# Patient Record
Sex: FEMALE | Race: BLACK OR AFRICAN AMERICAN | Employment: FULL TIME | URBAN - METROPOLITAN AREA
[De-identification: names, ages, dates, MRNs, and addresses within clinical notes are randomized per-mention and may not be internally consistent; named-entity substitution may affect disease eponyms.]

---

## 2017-10-12 PROBLEM — Z12.11 SCREENING FOR COLON CANCER: Status: ACTIVE | Noted: 2017-10-12

## 2017-10-12 PROBLEM — R10.13 EPIGASTRIC ABDOMINAL PAIN: Status: ACTIVE | Noted: 2017-10-12

## 2017-10-12 PROBLEM — K59.04 CHRONIC IDIOPATHIC CONSTIPATION: Status: ACTIVE | Noted: 2017-10-12

## 2017-10-21 ENCOUNTER — LAB ENCOUNTER (OUTPATIENT)
Dept: LAB | Facility: HOSPITAL | Age: 52
End: 2017-10-21
Attending: INTERNAL MEDICINE
Payer: COMMERCIAL

## 2017-10-21 DIAGNOSIS — E11.9 TYPE 2 DIABETES MELLITUS WITHOUT COMPLICATION, WITHOUT LONG-TERM CURRENT USE OF INSULIN (HCC): ICD-10-CM

## 2017-10-21 DIAGNOSIS — R10.13 EPIGASTRIC ABDOMINAL PAIN: ICD-10-CM

## 2017-10-21 DIAGNOSIS — K59.04 CHRONIC IDIOPATHIC CONSTIPATION: ICD-10-CM

## 2017-10-21 PROCEDURE — 85025 COMPLETE CBC W/AUTO DIFF WBC: CPT

## 2017-10-21 PROCEDURE — 83036 HEMOGLOBIN GLYCOSYLATED A1C: CPT

## 2017-10-21 PROCEDURE — 84443 ASSAY THYROID STIM HORMONE: CPT

## 2017-10-21 PROCEDURE — 36415 COLL VENOUS BLD VENIPUNCTURE: CPT

## 2017-10-21 PROCEDURE — 80053 COMPREHEN METABOLIC PANEL: CPT

## 2017-11-01 ENCOUNTER — OFFICE VISIT (OUTPATIENT)
Dept: INTERNAL MEDICINE CLINIC | Facility: CLINIC | Age: 52
End: 2017-11-01

## 2017-11-01 VITALS
TEMPERATURE: 98 F | HEIGHT: 64 IN | DIASTOLIC BLOOD PRESSURE: 86 MMHG | SYSTOLIC BLOOD PRESSURE: 128 MMHG | RESPIRATION RATE: 16 BRPM | HEART RATE: 125 BPM | BODY MASS INDEX: 31.07 KG/M2 | WEIGHT: 182 LBS | OXYGEN SATURATION: 98 %

## 2017-11-01 DIAGNOSIS — B35.3 TINEA PEDIS OF RIGHT FOOT: ICD-10-CM

## 2017-11-01 DIAGNOSIS — IMO0001 UNCONTROLLED TYPE 2 DIABETES MELLITUS WITHOUT COMPLICATION, WITHOUT LONG-TERM CURRENT USE OF INSULIN: Primary | ICD-10-CM

## 2017-11-01 PROBLEM — E11.9 TYPE 2 DIABETES MELLITUS WITHOUT COMPLICATION, WITHOUT LONG-TERM CURRENT USE OF INSULIN (HCC): Status: ACTIVE | Noted: 2017-11-01

## 2017-11-01 PROBLEM — E11.9 TYPE 2 DIABETES MELLITUS WITHOUT COMPLICATION, WITHOUT LONG-TERM CURRENT USE OF INSULIN (HCC): Status: RESOLVED | Noted: 2017-11-01 | Resolved: 2017-11-01

## 2017-11-01 PROCEDURE — 99203 OFFICE O/P NEW LOW 30 MIN: CPT | Performed by: STUDENT IN AN ORGANIZED HEALTH CARE EDUCATION/TRAINING PROGRAM

## 2017-11-01 RX ORDER — CLOTRIMAZOLE 1 %
1 CREAM (GRAM) TOPICAL 2 TIMES DAILY
Qty: 40 G | Refills: 0 | Status: SHIPPED | OUTPATIENT
Start: 2017-11-01

## 2017-11-01 RX ORDER — GLIMEPIRIDE 2 MG/1
2 TABLET ORAL
Qty: 30 TABLET | Refills: 0 | Status: SHIPPED | OUTPATIENT
Start: 2017-11-01 | End: 2018-01-01

## 2017-11-01 NOTE — PROGRESS NOTES
HPI:    Patient ID: April Karly Dejesus is a 49year old female. She is a new patient to the office. Was sent by her GI due to uncontrolled DM. She has intermittent diffuse abdominal pain and constipation.  Upper and lower endoscopies are planned when her DM is be Skin: Negative. Neurological: Negative. Negative for weakness. Hematological: Negative. Psychiatric/Behavioral: Negative.                Current Outpatient Prescriptions:  clotrimazole 1 % External Cream Apply 1 Application topically 2 (two) time complication, without long-term current use of insulin (hcc)  (primary encounter diagnosis)  Due to non-compliance. Last HbA1C is 11.8  Increase Metrformin to 850 mg BID, add Glimepiride 2 mg BID. Medicaitons side effects discussed. Monitor BS's daily.   D

## 2017-11-01 NOTE — PATIENT INSTRUCTIONS
Diabetes: Activity Tips    Being more active can help you manage your diabetes. The tips on this sheet can help you get the most from your exercise. They can also help you stay safe.   Staying Active  It’s important for adults to spend less time sitting a You may be told to plan your exercise for 1 to 2 hours after a meal. In most cases, you don’t need to eat while being active. If you take insulin or medicine that can cause low blood sugar, test your blood sugar before exercising.  And carry a fast-acting s Athlete’s foot (tinea pedis) is caused by a fungal infection in the skin. It affects the skin between the toes, causing cracks in the skin called fissures. It can also affect the bottom of the foot where it causes dry white scales and peeling of the skin. · Infection comes back soon after treatment  · Pus draining from cracks in the skin  Date Last Reviewed: 8/1/2016  © 9378-2778 [employer]. [address] [facility] [place] [address] [place] [address]. All rights reserved.  This information is not intended as a · Limit alcohol. It raises blood sugar levels. Drink water or calorie-free diet drinks that use safe sweeteners. · Eat less fat to help lower your risk of heart disease. Use nonfat or low-fat dairy products and lean meats. Avoid fried foods.  Use cooking o

## 2017-11-11 ENCOUNTER — APPOINTMENT (OUTPATIENT)
Dept: LAB | Facility: HOSPITAL | Age: 52
End: 2017-11-11
Attending: STUDENT IN AN ORGANIZED HEALTH CARE EDUCATION/TRAINING PROGRAM
Payer: COMMERCIAL

## 2017-11-11 DIAGNOSIS — R74.8 ELEVATED ALKALINE PHOSPHATASE LEVEL: ICD-10-CM

## 2017-11-11 DIAGNOSIS — B35.3 TINEA PEDIS OF RIGHT FOOT: ICD-10-CM

## 2017-11-11 DIAGNOSIS — IMO0001 UNCONTROLLED TYPE 2 DIABETES MELLITUS WITHOUT COMPLICATION, WITHOUT LONG-TERM CURRENT USE OF INSULIN: ICD-10-CM

## 2017-11-11 PROCEDURE — 82043 UR ALBUMIN QUANTITATIVE: CPT

## 2017-11-11 PROCEDURE — 84075 ASSAY ALKALINE PHOSPHATASE: CPT

## 2017-11-11 PROCEDURE — 80061 LIPID PANEL: CPT

## 2017-11-11 PROCEDURE — 84080 ASSAY ALKALINE PHOSPHATASES: CPT

## 2017-11-11 PROCEDURE — 82570 ASSAY OF URINE CREATININE: CPT

## 2017-11-11 PROCEDURE — 36415 COLL VENOUS BLD VENIPUNCTURE: CPT

## 2017-11-14 ENCOUNTER — TELEPHONE (OUTPATIENT)
Dept: INTERNAL MEDICINE CLINIC | Facility: CLINIC | Age: 52
End: 2017-11-14

## 2017-11-14 DIAGNOSIS — IMO0001 UNCONTROLLED TYPE 2 DIABETES MELLITUS WITHOUT COMPLICATION, WITHOUT LONG-TERM CURRENT USE OF INSULIN: Primary | ICD-10-CM

## 2017-11-14 DIAGNOSIS — R80.9 MICROALBUMINURIA: ICD-10-CM

## 2017-11-14 DIAGNOSIS — E78.5 HYPERLIPIDEMIA, UNSPECIFIED HYPERLIPIDEMIA TYPE: ICD-10-CM

## 2017-11-14 RX ORDER — ATORVASTATIN CALCIUM 10 MG/1
10 TABLET, FILM COATED ORAL NIGHTLY
Qty: 90 TABLET | Refills: 1 | Status: SHIPPED | OUTPATIENT
Start: 2017-11-14 | End: 2018-01-01

## 2017-11-14 RX ORDER — LOSARTAN POTASSIUM 25 MG/1
25 TABLET ORAL DAILY
Qty: 30 TABLET | Refills: 5 | Status: SHIPPED | OUTPATIENT
Start: 2017-11-14 | End: 2018-01-01

## 2017-11-14 NOTE — TELEPHONE ENCOUNTER
----- Message from Chelsea Hassan MD sent at 11/13/2017 11:01 PM CST -----  Elevated total cholesterol. Due to DM patient is in statin benefit group for CVD risk prevention. Recommend starting Atorvastatin 10 mg HS.    Microalbumin/Cr ratio is high and otto

## 2017-11-14 NOTE — TELEPHONE ENCOUNTER
Spoke with patient and relayed results. She verbalized understanding and agreed with plan. Patient has scheduled an appointment as she would like to address other issues with Dr. Darci Whitman. She c/o constipation, bloating and gas x2 days.  Also c/o vaginal

## 2017-11-16 ENCOUNTER — OFFICE VISIT (OUTPATIENT)
Dept: INTERNAL MEDICINE CLINIC | Facility: CLINIC | Age: 52
End: 2017-11-16

## 2017-11-16 VITALS
OXYGEN SATURATION: 95 % | HEIGHT: 64 IN | TEMPERATURE: 98 F | HEART RATE: 118 BPM | WEIGHT: 185 LBS | BODY MASS INDEX: 31.58 KG/M2 | SYSTOLIC BLOOD PRESSURE: 112 MMHG | RESPIRATION RATE: 18 BRPM | DIASTOLIC BLOOD PRESSURE: 78 MMHG

## 2017-11-16 DIAGNOSIS — R10.84 DIFFUSE ABDOMINAL PAIN: Primary | ICD-10-CM

## 2017-11-16 DIAGNOSIS — L30.4 INTERTRIGO: ICD-10-CM

## 2017-11-16 PROCEDURE — 99214 OFFICE O/P EST MOD 30 MIN: CPT | Performed by: STUDENT IN AN ORGANIZED HEALTH CARE EDUCATION/TRAINING PROGRAM

## 2017-11-16 RX ORDER — FLUCONAZOLE 150 MG/1
150 TABLET ORAL ONCE
Qty: 1 TABLET | Refills: 0 | Status: SHIPPED | OUTPATIENT
Start: 2017-11-16 | End: 2018-03-22

## 2017-11-16 RX ORDER — CLOTRIMAZOLE AND BETAMETHASONE DIPROPIONATE 10; .64 MG/G; MG/G
1 CREAM TOPICAL 2 TIMES DAILY
Qty: 45 G | Refills: 0 | Status: SHIPPED | OUTPATIENT
Start: 2017-11-16 | End: 2018-01-01

## 2017-11-19 NOTE — PROGRESS NOTES
HPI:    Patient ID: April Maty Huber is a 49year old female. Abdominal Pain   This is a new problem. Episode onset: about 2 weeks. The onset quality is undetermined. The problem occurs intermittently.  Duration: cramps-like pain that is short lived and goes hematuria. Musculoskeletal: Negative. Negative for arthralgias and myalgias. Skin: Positive for rash. Neurological: Negative. Negative for headaches. Psychiatric/Behavioral: Negative.              Current Outpatient Prescriptions:  clotrimazole-be time. She has normal reflexes. Skin: Skin is warm and dry. Rash (intertrigo in the lower abdomnal fold, macerated skin, no wounds.) noted. Psychiatric: She has a normal mood and affect.  Her behavior is normal. Judgment and thought content normal.   Christi

## 2017-11-25 ENCOUNTER — TELEPHONE (OUTPATIENT)
Dept: INTERNAL MEDICINE CLINIC | Facility: CLINIC | Age: 52
End: 2017-11-25

## 2017-11-25 ENCOUNTER — HOSPITAL ENCOUNTER (OUTPATIENT)
Dept: ULTRASOUND IMAGING | Age: 52
Discharge: HOME | End: 2017-11-25
Attending: STUDENT IN AN ORGANIZED HEALTH CARE EDUCATION/TRAINING PROGRAM
Payer: COMMERCIAL

## 2017-11-25 DIAGNOSIS — R10.84 DIFFUSE ABDOMINAL PAIN: ICD-10-CM

## 2017-11-25 PROCEDURE — 76700 US EXAM ABDOM COMPLETE: CPT | Performed by: STUDENT IN AN ORGANIZED HEALTH CARE EDUCATION/TRAINING PROGRAM

## 2017-11-25 NOTE — TELEPHONE ENCOUNTER
Radiology paged with ultrasound results. Called patient with result of abdominal ultrasound with the following findings:     CONCLUSION:       1. Mild bilateral hydronephrosis.      2. Possible left nephrolithiasis with the largest stone measuring up to

## 2017-11-28 ENCOUNTER — TELEPHONE (OUTPATIENT)
Dept: INTERNAL MEDICINE CLINIC | Facility: CLINIC | Age: 52
End: 2017-11-28

## 2017-11-28 DIAGNOSIS — N13.30 HYDRONEPHROSIS, UNSPECIFIED HYDRONEPHROSIS TYPE: Primary | ICD-10-CM

## 2017-11-28 NOTE — TELEPHONE ENCOUNTER
----- Message from Lisa Isabel MD sent at 11/27/2017 10:13 PM CST -----  Radiology paged to Tayler Neal on 11/25/17, results have been discussed with the patient.   It seems like bilateral hydronephrosis and nephrolithiasis are incidental findings that are not

## 2017-11-28 NOTE — TELEPHONE ENCOUNTER
[address] [place] [address] 835, [address]   Phone: [phone]   Referral information given to pt. She expressed understanding. Referral placed.

## 2018-01-01 ENCOUNTER — LAB ENCOUNTER (OUTPATIENT)
Dept: LAB | Facility: HOSPITAL | Age: 53
End: 2018-01-01
Attending: STUDENT IN AN ORGANIZED HEALTH CARE EDUCATION/TRAINING PROGRAM
Payer: COMMERCIAL

## 2018-01-01 ENCOUNTER — TELEPHONE (OUTPATIENT)
Dept: INTERNAL MEDICINE CLINIC | Facility: CLINIC | Age: 53
End: 2018-01-01

## 2018-01-01 ENCOUNTER — PATIENT OUTREACH (OUTPATIENT)
Dept: INTERNAL MEDICINE CLINIC | Facility: CLINIC | Age: 53
End: 2018-01-01

## 2018-01-01 ENCOUNTER — HOSPITAL ENCOUNTER (OUTPATIENT)
Dept: MAMMOGRAPHY | Age: 53
Discharge: HOME | End: 2018-01-01
Attending: STUDENT IN AN ORGANIZED HEALTH CARE EDUCATION/TRAINING PROGRAM
Payer: COMMERCIAL

## 2018-01-01 ENCOUNTER — OFFICE VISIT (OUTPATIENT)
Dept: INTERNAL MEDICINE CLINIC | Facility: CLINIC | Age: 53
End: 2018-01-01

## 2018-01-01 VITALS
HEART RATE: 122 BPM | DIASTOLIC BLOOD PRESSURE: 84 MMHG | BODY MASS INDEX: 30.12 KG/M2 | WEIGHT: 170 LBS | SYSTOLIC BLOOD PRESSURE: 128 MMHG | RESPIRATION RATE: 16 BRPM | TEMPERATURE: 98 F | HEIGHT: 63 IN

## 2018-01-01 DIAGNOSIS — Z12.39 BREAST CANCER SCREENING: ICD-10-CM

## 2018-01-01 DIAGNOSIS — IMO0001 UNCONTROLLED TYPE 2 DIABETES MELLITUS WITHOUT COMPLICATION, WITHOUT LONG-TERM CURRENT USE OF INSULIN: ICD-10-CM

## 2018-01-01 DIAGNOSIS — E78.5 HYPERLIPIDEMIA, UNSPECIFIED HYPERLIPIDEMIA TYPE: ICD-10-CM

## 2018-01-01 DIAGNOSIS — R80.9 MICROALBUMINURIA: ICD-10-CM

## 2018-01-01 DIAGNOSIS — G89.29 ABDOMINAL PAIN, CHRONIC, GENERALIZED: Primary | ICD-10-CM

## 2018-01-01 DIAGNOSIS — R10.84 ABDOMINAL PAIN, CHRONIC, GENERALIZED: Primary | ICD-10-CM

## 2018-01-01 DIAGNOSIS — N30.00 ACUTE CYSTITIS WITHOUT HEMATURIA: Primary | ICD-10-CM

## 2018-01-01 DIAGNOSIS — R00.0 TACHYCARDIA: ICD-10-CM

## 2018-01-01 DIAGNOSIS — N76.0 VULVOVAGINITIS: ICD-10-CM

## 2018-01-01 DIAGNOSIS — Z13.0 SCREENING FOR IRON DEFICIENCY ANEMIA: ICD-10-CM

## 2018-01-01 PROCEDURE — 81001 URINALYSIS AUTO W/SCOPE: CPT

## 2018-01-01 PROCEDURE — 80053 COMPREHEN METABOLIC PANEL: CPT

## 2018-01-01 PROCEDURE — 85025 COMPLETE CBC W/AUTO DIFF WBC: CPT

## 2018-01-01 PROCEDURE — 83036 HEMOGLOBIN GLYCOSYLATED A1C: CPT

## 2018-01-01 PROCEDURE — 99214 OFFICE O/P EST MOD 30 MIN: CPT | Performed by: STUDENT IN AN ORGANIZED HEALTH CARE EDUCATION/TRAINING PROGRAM

## 2018-01-01 PROCEDURE — 36415 COLL VENOUS BLD VENIPUNCTURE: CPT

## 2018-01-01 PROCEDURE — 80061 LIPID PANEL: CPT

## 2018-01-01 PROCEDURE — 77063 BREAST TOMOSYNTHESIS BI: CPT | Performed by: STUDENT IN AN ORGANIZED HEALTH CARE EDUCATION/TRAINING PROGRAM

## 2018-01-01 PROCEDURE — 77067 SCR MAMMO BI INCL CAD: CPT | Performed by: STUDENT IN AN ORGANIZED HEALTH CARE EDUCATION/TRAINING PROGRAM

## 2018-01-01 RX ORDER — LOSARTAN POTASSIUM 25 MG/1
25 TABLET ORAL DAILY
Qty: 90 TABLET | Refills: 2 | Status: SHIPPED | OUTPATIENT
Start: 2018-01-01

## 2018-01-01 RX ORDER — GLIMEPIRIDE 2 MG/1
2 TABLET ORAL 2 TIMES DAILY
Qty: 180 TABLET | Refills: 0 | Status: SHIPPED | OUTPATIENT
Start: 2018-01-01

## 2018-01-01 RX ORDER — SULFAMETHOXAZOLE AND TRIMETHOPRIM 800; 160 MG/1; MG/1
1 TABLET ORAL 2 TIMES DAILY
Qty: 6 TABLET | Refills: 0 | Status: SHIPPED | OUTPATIENT
Start: 2018-01-01

## 2018-01-01 RX ORDER — ATORVASTATIN CALCIUM 10 MG/1
10 TABLET, FILM COATED ORAL NIGHTLY
Qty: 90 TABLET | Refills: 1 | Status: SHIPPED | OUTPATIENT
Start: 2018-01-01

## 2018-03-13 ENCOUNTER — PATIENT OUTREACH (OUTPATIENT)
Dept: INTERNAL MEDICINE CLINIC | Facility: CLINIC | Age: 53
End: 2018-03-13

## 2018-05-16 NOTE — PROGRESS NOTES
[facility]    REASON FOR VISIT:    Current Complaints: Patient presents with:  Stomach Pain: 3 weeks. Diabetes: f/u      HPI:  April Lurdes Tadeo is a 47year old female who presents with c/o generalized abdominal pain on and off.  Reports feeling blo Application topically 2 (two) times daily. Disp: 40 g Rfl: 0   aspirin 81 MG Oral Tab Take 1 tablet (81 mg total) by mouth daily. Disp: 30 tablet Rfl: 0        REVIEW OF SYSTEMS:   Constitutional: Negative for fever, chills and fatigue.    Neurological: Holly Angles normal, visualized feet and the appearance is normal.  Bilateral monofilament/sensation of both feet is normal.  Pulsation pedal pulse exam of both lower legs/feet is normal as well. Neurological: Alert and oriented to person, place, and time.  Normal refl tablet (2 mg total) by mouth 2 (two) times daily. Tachycardia   Chronic asymptomatic tachycardia. Normal TSH. Check 24 hour Holter monitoring.  -     CARD MONITOR HOLTER 24 HOUR (CPT=93460);  Future    Vulvovaginitis   Triamcinolone–nystatin cream to a

## 2018-06-01 NOTE — TELEPHONE ENCOUNTER
Spoke with pt she will have her labs done tomorrow and will await those results then schedule imaging.

## 2018-06-04 NOTE — TELEPHONE ENCOUNTER
----- Message from Anat Riddle MD sent at 6/4/2018  9:07 AM CDT -----  Results as follows: HbA1C is still very high - pt was not taking her diabetic medications.  I spoke to her in length at her last OV regarding the importance of mediation compliance an

## 2018-06-04 NOTE — TELEPHONE ENCOUNTER
Relayed results and recommendations to patient. States she is taking all medications as directed and will call back to schedule follow up appointment. She c/o dysuria and increased frequency and agreed with Bactrim therapy. RX sent to pharmacy.

## 2019-01-01 ENCOUNTER — HOSPITAL ENCOUNTER (INPATIENT)
Facility: HOSPITAL | Age: 54
LOS: 1 days | DRG: 215 | End: 2019-01-01
Attending: STUDENT IN AN ORGANIZED HEALTH CARE EDUCATION/TRAINING PROGRAM | Admitting: HOSPITALIST
Payer: MEDICAID

## 2019-01-01 ENCOUNTER — APPOINTMENT (OUTPATIENT)
Dept: CV DIAGNOSTICS | Facility: HOSPITAL | Age: 54
DRG: 215 | End: 2019-01-01
Attending: INTERNAL MEDICINE
Payer: MEDICAID

## 2019-01-01 ENCOUNTER — APPOINTMENT (OUTPATIENT)
Dept: GENERAL RADIOLOGY | Facility: HOSPITAL | Age: 54
DRG: 215 | End: 2019-01-01
Payer: MEDICAID

## 2019-01-01 ENCOUNTER — APPOINTMENT (OUTPATIENT)
Dept: INTERVENTIONAL RADIOLOGY/VASCULAR | Facility: HOSPITAL | Age: 54
DRG: 215 | End: 2019-01-01
Attending: INTERNAL MEDICINE
Payer: MEDICAID

## 2019-01-01 ENCOUNTER — APPOINTMENT (OUTPATIENT)
Dept: GENERAL RADIOLOGY | Facility: HOSPITAL | Age: 54
DRG: 215 | End: 2019-01-01
Attending: Other
Payer: MEDICAID

## 2019-01-01 VITALS
OXYGEN SATURATION: 93 % | BODY MASS INDEX: 34 KG/M2 | HEART RATE: 164 BPM | TEMPERATURE: 91 F | SYSTOLIC BLOOD PRESSURE: 129 MMHG | WEIGHT: 192.25 LBS | DIASTOLIC BLOOD PRESSURE: 95 MMHG | RESPIRATION RATE: 100 BRPM

## 2019-01-01 DIAGNOSIS — I46.9 CARDIAC ARREST (HCC): Primary | ICD-10-CM

## 2019-01-01 PROCEDURE — 5A12012 PERFORMANCE OF CARDIAC OUTPUT, SINGLE, MANUAL: ICD-10-PCS | Performed by: STUDENT IN AN ORGANIZED HEALTH CARE EDUCATION/TRAINING PROGRAM

## 2019-01-01 PROCEDURE — 71045 X-RAY EXAM CHEST 1 VIEW: CPT | Performed by: OTHER

## 2019-01-01 PROCEDURE — 5A1935Z RESPIRATORY VENTILATION, LESS THAN 24 CONSECUTIVE HOURS: ICD-10-PCS | Performed by: STUDENT IN AN ORGANIZED HEALTH CARE EDUCATION/TRAINING PROGRAM

## 2019-01-01 PROCEDURE — 027034Z DILATION OF CORONARY ARTERY, ONE ARTERY WITH DRUG-ELUTING INTRALUMINAL DEVICE, PERCUTANEOUS APPROACH: ICD-10-PCS | Performed by: INTERNAL MEDICINE

## 2019-01-01 PROCEDURE — 0BH17EZ INSERTION OF ENDOTRACHEAL AIRWAY INTO TRACHEA, VIA NATURAL OR ARTIFICIAL OPENING: ICD-10-PCS | Performed by: STUDENT IN AN ORGANIZED HEALTH CARE EDUCATION/TRAINING PROGRAM

## 2019-01-01 PROCEDURE — 5A0221D ASSISTANCE WITH CARDIAC OUTPUT USING IMPELLER PUMP, CONTINUOUS: ICD-10-PCS | Performed by: INTERNAL MEDICINE

## 2019-01-01 PROCEDURE — 5A2204Z RESTORATION OF CARDIAC RHYTHM, SINGLE: ICD-10-PCS | Performed by: INTERNAL MEDICINE

## 2019-01-01 PROCEDURE — 93306 TTE W/DOPPLER COMPLETE: CPT | Performed by: INTERNAL MEDICINE

## 2019-01-01 PROCEDURE — 5A2204Z RESTORATION OF CARDIAC RHYTHM, SINGLE: ICD-10-PCS | Performed by: STUDENT IN AN ORGANIZED HEALTH CARE EDUCATION/TRAINING PROGRAM

## 2019-01-01 PROCEDURE — 71045 X-RAY EXAM CHEST 1 VIEW: CPT | Performed by: STUDENT IN AN ORGANIZED HEALTH CARE EDUCATION/TRAINING PROGRAM

## 2019-01-01 PROCEDURE — 02HA3RZ INSERTION OF SHORT-TERM EXTERNAL HEART ASSIST SYSTEM INTO HEART, PERCUTANEOUS APPROACH: ICD-10-PCS | Performed by: INTERNAL MEDICINE

## 2019-01-01 PROCEDURE — B41C1ZZ FLUOROSCOPY OF PELVIC ARTERIES USING LOW OSMOLAR CONTRAST: ICD-10-PCS | Performed by: INTERNAL MEDICINE

## 2019-01-01 PROCEDURE — 3E033GC INTRODUCTION OF OTHER THERAPEUTIC SUBSTANCE INTO PERIPHERAL VEIN, PERCUTANEOUS APPROACH: ICD-10-PCS | Performed by: INTERNAL MEDICINE

## 2019-01-01 PROCEDURE — 6A4Z0ZZ HYPOTHERMIA, SINGLE: ICD-10-PCS | Performed by: INTERNAL MEDICINE

## 2019-01-01 PROCEDURE — 4A023N8 MEASUREMENT OF CARDIAC SAMPLING AND PRESSURE, BILATERAL, PERCUTANEOUS APPROACH: ICD-10-PCS | Performed by: INTERNAL MEDICINE

## 2019-01-01 PROCEDURE — B2111ZZ FLUOROSCOPY OF MULTIPLE CORONARY ARTERIES USING LOW OSMOLAR CONTRAST: ICD-10-PCS | Performed by: INTERNAL MEDICINE

## 2019-01-01 PROCEDURE — 93307 TTE W/O DOPPLER COMPLETE: CPT | Performed by: INTERNAL MEDICINE

## 2019-01-01 PROCEDURE — 99223 1ST HOSP IP/OBS HIGH 75: CPT | Performed by: HOSPITALIST

## 2019-01-01 RX ORDER — SODIUM CHLORIDE 9 MG/ML
INJECTION, SOLUTION INTRAVENOUS
Status: COMPLETED | OUTPATIENT
Start: 2019-01-01 | End: 2019-01-01

## 2019-01-01 RX ORDER — HEPARIN SODIUM 5000 [USP'U]/ML
60 INJECTION INTRAVENOUS; SUBCUTANEOUS ONCE
Status: DISCONTINUED | OUTPATIENT
Start: 2019-01-01 | End: 2019-01-01

## 2019-01-01 RX ORDER — FUROSEMIDE 10 MG/ML
INJECTION INTRAMUSCULAR; INTRAVENOUS
Status: DISCONTINUED
Start: 2019-01-01 | End: 2019-01-01

## 2019-01-01 RX ORDER — BUSPIRONE HYDROCHLORIDE 15 MG/1
30 TABLET ORAL EVERY 8 HOURS
Status: DISCONTINUED | OUTPATIENT
Start: 2019-01-01 | End: 2019-01-01

## 2019-01-01 RX ORDER — LORAZEPAM 2 MG/ML
1 INJECTION INTRAMUSCULAR
Status: DISCONTINUED | OUTPATIENT
Start: 2019-01-01 | End: 2019-01-01

## 2019-01-01 RX ORDER — LIDOCAINE HYDROCHLORIDE AND EPINEPHRINE 10; 10 MG/ML; UG/ML
INJECTION, SOLUTION INFILTRATION; PERINEURAL
Status: COMPLETED
Start: 2019-01-01 | End: 2019-01-01

## 2019-01-01 RX ORDER — LORAZEPAM 2 MG/ML
INJECTION INTRAMUSCULAR
Status: COMPLETED
Start: 2019-01-01 | End: 2019-01-01

## 2019-01-01 RX ORDER — ASPIRIN 300 MG
300 SUPPOSITORY, RECTAL RECTAL ONCE
Status: DISCONTINUED | OUTPATIENT
Start: 2019-01-01 | End: 2019-01-01

## 2019-01-01 RX ORDER — HEPARIN SODIUM 5000 [USP'U]/ML
INJECTION, SOLUTION INTRAVENOUS; SUBCUTANEOUS
Status: COMPLETED
Start: 2019-01-01 | End: 2019-01-01

## 2019-01-01 RX ORDER — PHENYLEPHRINE HCL IN 0.9% NACL 50MG/250ML
PLASTIC BAG, INJECTION (ML) INTRAVENOUS
Status: COMPLETED
Start: 2019-01-01 | End: 2019-01-01

## 2019-01-01 RX ORDER — HEPARIN SODIUM 5000 [USP'U]/ML
INJECTION, SOLUTION INTRAVENOUS; SUBCUTANEOUS
Status: DISPENSED
Start: 2019-01-01 | End: 2019-01-01

## 2019-01-01 RX ORDER — PHENYLEPHRINE HCL IN 0.9% NACL 50MG/250ML
PLASTIC BAG, INJECTION (ML) INTRAVENOUS CONTINUOUS
Status: DISCONTINUED | OUTPATIENT
Start: 2019-01-01 | End: 2019-01-01

## 2019-01-01 RX ORDER — DEXTROSE MONOHYDRATE 25 G/50ML
50 INJECTION, SOLUTION INTRAVENOUS
Status: DISCONTINUED | OUTPATIENT
Start: 2019-01-01 | End: 2019-01-01

## 2019-01-01 RX ORDER — MEPERIDINE HYDROCHLORIDE 25 MG/ML
25 INJECTION INTRAMUSCULAR; INTRAVENOUS; SUBCUTANEOUS
Status: DISCONTINUED | OUTPATIENT
Start: 2019-01-01 | End: 2019-01-01

## 2019-01-01 RX ORDER — ASPIRIN 300 MG
SUPPOSITORY, RECTAL RECTAL
Status: DISPENSED
Start: 2019-01-01 | End: 2019-01-01

## 2019-01-01 RX ORDER — ALBUMIN, HUMAN INJ 5% 5 %
SOLUTION INTRAVENOUS
Status: DISCONTINUED
Start: 2019-01-01 | End: 2019-01-01

## 2019-01-01 RX ORDER — LIDOCAINE HYDROCHLORIDE 10 MG/ML
INJECTION, SOLUTION EPIDURAL; INFILTRATION; INTRACAUDAL; PERINEURAL
Status: DISCONTINUED
Start: 2019-01-01 | End: 2019-01-01 | Stop reason: WASHOUT

## 2019-01-01 RX ORDER — ALBUMIN, HUMAN INJ 5% 5 %
500 SOLUTION INTRAVENOUS ONCE
Status: COMPLETED | OUTPATIENT
Start: 2019-01-01 | End: 2019-01-01

## 2019-01-01 RX ORDER — FUROSEMIDE 10 MG/ML
40 INJECTION INTRAMUSCULAR; INTRAVENOUS ONCE
Status: COMPLETED | OUTPATIENT
Start: 2019-01-01 | End: 2019-01-01

## 2019-01-01 RX ORDER — AMIODARONE HYDROCHLORIDE 50 MG/ML
INJECTION, SOLUTION INTRAVENOUS
Status: DISPENSED
Start: 2019-01-01 | End: 2019-01-01

## 2019-01-01 RX ORDER — MIDAZOLAM HYDROCHLORIDE 1 MG/ML
2 INJECTION INTRAMUSCULAR; INTRAVENOUS
Status: DISCONTINUED | OUTPATIENT
Start: 2019-01-01 | End: 2019-01-01

## 2019-01-01 RX ORDER — ACETAMINOPHEN 160 MG/5ML
650 SOLUTION ORAL EVERY 6 HOURS SCHEDULED
Status: DISCONTINUED | OUTPATIENT
Start: 2019-01-01 | End: 2019-01-01

## 2019-01-01 RX ORDER — HEPARIN SODIUM 5000 [USP'U]/ML
INJECTION, SOLUTION INTRAVENOUS; SUBCUTANEOUS
Status: DISCONTINUED
Start: 2019-01-01 | End: 2019-01-01 | Stop reason: WASHOUT

## 2019-01-01 RX ORDER — DOPAMINE HYDROCHLORIDE 320 MG/100ML
INJECTION, SOLUTION INTRAVENOUS
Status: COMPLETED | OUTPATIENT
Start: 2019-01-01 | End: 2019-01-01

## 2019-01-01 RX ORDER — AMIODARONE HYDROCHLORIDE 450 MG/9ML
INJECTION, SOLUTION INTRAVENOUS
Status: COMPLETED
Start: 2019-01-01 | End: 2019-01-01

## 2019-01-01 RX ORDER — AMIODARONE HYDROCHLORIDE 50 MG/ML
INJECTION, SOLUTION INTRAVENOUS
Status: COMPLETED | OUTPATIENT
Start: 2019-01-01 | End: 2019-01-01

## 2019-01-01 RX ORDER — LIDOCAINE HYDROCHLORIDE 10 MG/ML
INJECTION, SOLUTION EPIDURAL; INFILTRATION; INTRACAUDAL; PERINEURAL
Status: COMPLETED
Start: 2019-01-01 | End: 2019-01-01

## 2019-01-01 RX ORDER — ACETAMINOPHEN 650 MG/1
650 SUPPOSITORY RECTAL EVERY 6 HOURS SCHEDULED
Status: DISCONTINUED | OUTPATIENT
Start: 2019-01-01 | End: 2019-01-01

## 2019-03-31 PROBLEM — I46.9 CARDIAC ARREST (HCC): Status: ACTIVE | Noted: 2019-01-01

## 2019-03-31 PROBLEM — Z79.4 TYPE 2 DIABETES MELLITUS WITHOUT COMPLICATION, WITH LONG-TERM CURRENT USE OF INSULIN (HCC): Status: ACTIVE | Noted: 2017-11-01

## 2019-03-31 PROBLEM — E11.9 TYPE 2 DIABETES MELLITUS WITHOUT COMPLICATION, WITH LONG-TERM CURRENT USE OF INSULIN (HCC): Status: ACTIVE | Noted: 2017-11-01

## 2019-03-31 NOTE — PROGRESS NOTES
Prior to transfer to cath lab patient had a recurrent VF arrest.    ACLS initiated and brought emergently to lab with Impella flows 2.4 - 2.7    Impella in good position by fluoroscopy. Unable to establish a spontaneous rhythm.  Pacer no longer able to c

## 2019-03-31 NOTE — CONSULTS
Cardiology Consult Maeve Mail)   Dictated #[id number]  March 30, 2019    IMPRESSION:  Out of hospital cardiac arrest--recurrent VT/VF/PEA  Acute AWMI  RBBB  Cardiogenic shock  Acute abdominal pain  Markedly elevated transaminases  HTN  DM  Dyslipidemia  Smoker

## 2019-03-31 NOTE — CONSULTS
[facility]    PATIENT'S NAME: Brad Leal   ATTENDING PHYSICIAN: MAURICE Padilla PHYSICIAN: Bertha Dakins, M.D.    PATIENT ACCOUNT#:   [de-identified]    LOCATION:  [address] [place] [address]  MEDICAL RECORD #:   [id number]       DATE OF BIRTH: disease. PAST MEDICAL HISTORY:  Chronic constipation and weight loss have been issues. She has had a history of esophageal reflux. PAST SURGICAL HISTORY:  Remarkable for cholecystectomy. MEDICATIONS:  On presentation include metformin 850 mg p. o. Chest x-ray shows essentially clear lung fields. Heart size upper limits of normal.      EKG shows a tachycardic rhythm with a right bundle branch block and an injury current in V1 through V4. There is right axis deviation. IMPRESSION:    1.    Out

## 2019-03-31 NOTE — PROGRESS NOTES
03/31/19 1224   Clinical Encounter Type   Visited With Family; Health care provider   Routine Visit Follow-up   Continue Visiting No   Crisis Visit Death   Patient Spiritual Encounters   Spiritual Interventions  present at physician notification

## 2019-03-31 NOTE — CONSULTS
[facility]  Report of Consultation     Patient Status:  Inpatient    1965 MRN [id number]   [facility] 6NE-A Attending Juma Metz, DO   Hosp Day # 0 PCP Veronica Yoder MD     Reason for Consultation:  Critical care bruising    • Esophageal reflux    • Fatigue    • Flatulence/gas pain/belching    • Frequent urination    • H. pylori infection    • Irregular bowel habits    • Itch of skin    • Night sweats    • Problems with swallowing    • Uncomfortable fullness after Family unaware  Ears, nose, mouth, throat, and face:  They were unaware of any difficulty swallowing  Respiratory: Chronic cough with reported negative chest x-ray in the recent past  Cardiovascular: Unaware of any history or symptoms chronic GERD chronic m 111 — — —   03/31/19 0056 (!) 63/40 — 106 17 — —   03/31/19 0049 102/89 — 115 17 — —         Physical Exam:   General: Currently unresponsive   Head: Normocephalic, without obvious abnormality, atraumatic.    Eyes/Nose: Pupils move minimally 3 mm   Throat: diabetes mellitus without complication, with long-term current use of insulin (HCC)     Uncontrolled diabetes mellitus type 2 without complications ([address]     Cardiac arrest ([facility] [address]     Metabolic acidosis     Acute respiratory failure (HCC)    Impression    -

## 2019-03-31 NOTE — PLAN OF CARE
Assumed care of patient at [address] [place] [address]. Patient intubated and unresponsive. Patient hypotensive with levophed, vasopressin, and epinephrine infusing. Rusty-synephrine started and 0.9 boluses given. Albumin given. Patient having suction alarms, cardiology notified.

## 2019-03-31 NOTE — ED INITIAL ASSESSMENT (HPI)
Note from 0045. Pt arrives receiving compressions and [facility]. Pt was found in v-fib, shocked 4 times and given 2 rounds of epi. Pt was found down by family.

## 2019-03-31 NOTE — H&P
[facility]  History and Physical     April Lou Patient Status:  Inpatient    1965 MRN [id number]   [facility] 6NE-A Attending John Agustin MD   Hosp Day # 0 PCP Michelle Cardoso MD     Chief Complaint: cardiac arrest    H next week for chronic constipation and bloating.       Past Medical History:  Past Medical History:   Diagnosis Date   • Bad breath    • Bloating    • Constipation    • Easy bruising    • Esophageal reflux    • Fatigue    • Flatulence/gas pain/belching    • Apply 1 Application topically 2 (two) times daily. Disp: 40 g Rfl: 0   aspirin 81 MG Oral Tab Take 1 tablet (81 mg total) by mouth daily. Disp: 30 tablet Rfl: 0       Review of Systems:   Unable to perform due to patient factors    Physical Exam:    BP Doyce Gins ) MD  3/31/2019

## 2019-03-31 NOTE — PROGRESS NOTES
03/31/19 0912   Clinical Encounter Type   Visited With Health care provider;Family   Routine Visit Follow-up   Continue Visiting No  (upon request or as needed)   Surgical Visit In surgery   Crisis Visit Trauma   Patient Spiritual Encounters   Spiritual

## 2019-03-31 NOTE — PROGRESS NOTES
03/31/19 0408   Clinical Encounter Type   Visited With (Large and extended family waiting. Pt admitted to ER, then [facility].  Pt will be admitted to 66 after the procedure)   Routine Visit Introduction   Continue Visiting Yes   Crisis Visit Critical ca

## 2019-03-31 NOTE — PROGRESS NOTES
Reviewed at bedside again with Salvador Joya and Luis Tanner. Continues to struggle with acid-base and subsequently requiring increasing vasopressor support. Transiently showed some improvement when developed sinus rhythm yet heart block has again returned.     7.

## 2019-03-31 NOTE — PLAN OF CARE
Received patient from cath lab at [id number], while connecting the monitor pt went into vfib shocked 1 time at 200J. Returned to v-paced rhythm. Impella in place in right groin with oozing on P5 level. . Dr Omid Beard at bedside.  Stat echo done and impella placement

## 2019-03-31 NOTE — PROGRESS NOTES
Met with Dr. Cricket Goodman and Dr. Dulce Maria Chandler in the ER. After continued ACLS in ER developed return of spontaneous circulation with a palpable and sustained femoral pulse.  Given her young age we made the decision to take her to the cath lab in hopes of optimizin

## 2019-03-31 NOTE — PROGRESS NOTES
Recurrent VF when transferred to CCU bed - responded to cardioversion.     Acid-base worsening despite support -- pH 7.19/18/145    Additional bicarb given    Pressors adjusted - now vasopressin, levophed and epinephrine    Echo at bedside confirms good Imp

## 2019-04-01 NOTE — PROCEDURES
[address] [place]    PATIENT'S NAME: Joyce Jose   ATTENDING PHYSICIAN: Christopher Kidd. Lashonda Tran D.O.   OPERATING PHYSICIAN: Madeline Christianson M.D.    PATIENT ACCOUNT#:   [de-identified]    LOCATION:  [address] [place] [address]  MEDICAL RECORD #:   [id number]       DATE OF BIRTH: patient's prolonged resuscitation, my plan was to define the anatomy as above and then place an Impella before attempting revascularization. Angiography through the sheath revealed small atherosclerotic vasculature.   I had placed a 6-Lao sheath in the peripheral vasculature, once all our equipment was in place, I used a 6-Hebrew crossover catheter to take a picture of the right common iliac artery. The iliac was patent, however, there was virtually no flow beyond the external iliac.   At this point, jus

## 2019-04-01 NOTE — DISCHARGE SUMMARY
[facility]  DISCHARGE SUMMARY      Patient Status:  Inpatient    1965 MRN [id number]   [facility] 6NE-A Attending No att. providers found   Hosp Day # 1 PCP Jing Roberson MD     Date of Admission: 3/31/2019  Date o an  MRI of her abdomen and was told that she probably had a viral bug causing her symptoms. She was started on Insulin after her discharge from [facility] which her daughter states is new for her.   She was scheduled to have an outpatient EGD and colonosc

## 2019-04-10 NOTE — PAYOR COMM NOTE
--------------  ADMISSION REVIEW     Payor: Po Boles #:  [id number]  Authorization Number: [id number]    Admit date: 3/31/19  Admit time: 9893       Admitting Physician: John Agustin MD  Attending Physician:  No att. providers found  Primary Ca systems are as noted in HPI. Constitutional and vital signs reviewed. All other systems reviewed and negative except as noted above.     Physical Exam     ED Triage Vitals   BP 03/31/19 0049 102/89   Pulse 03/31/19 0049 115   Resp 03/31/19 0049 17   Tem Excess -23.9 (*)     Lactic Acid Arterial >17.5 (*)     All other components within normal limits    Narrative:     [place]   MANUAL DIFFERENTIAL - Abnormal; Notable for the following components:    Lymphocyte Absolute Manual 7.40 (*)     Myelocyte Absolut x-ray was ordered. Preparations for Cath Lab were also undertaken. Rectal aspirin and heparin was ordered. While awaiting Cath Lab arrival patient went briefly into V. tach swiftly followed by LD vitale.   We attempted to defibrillate and CPR was restar of GERD, essential hypertension, hyperlipidemia who comes to the ER with an out of hospital cardiac arrest.  She was at a  Birthday party last night with her family. After eating she was not feeling well and had an episode of vomiting.   She then went to t Oral Tab per tablet Take 1 tablet by mouth 2 (two) times daily. Disp: 6 tablet Rfl: 0   nystatin-triamcinolone 708956-2.0 UNIT/GM-% External Cream Apply 1 Application topically 3 (three) times daily.  Disp: 60 g Rfl: 1   MetFORMIN HCl 850 MG Oral Tab TAKE 1 CHB  1. S/p stent to LAD  2. zoll cooling device, impella  3. Pacemaker placed for CHB  4. Cardiology input appreciated  2. Severe metabolic acidosis  1. Renal consult  2. IVF  3. DM type II- uncontrolled  1. Insulin gtt  4. Renal insuff  5.  Acute respirao Bag        lidocaine-EPINEPHrine 1 %-1:062574 injection   Start: 03/31/19 0151 End: 03/31/19 0151       LORazepam (ATIVAN) 2 MG/ML injection   Start: 03/31/19 0612 End: 03/31/19 0615    9027-RRLPK        phenylephrine in NaCl (ROSS-SYNEPHRINE) 50 mg/250 ml 50 mg in sodium chloride 0.9% 250 mL IVPB for BRIDGING   Rate: 104.6 mL/hr Dose: 4 mcg/kg/min  Weight Dosing Info: 87.2 kg  Freq: Continuous Route: IV  Last Dose: 4 mcg/kg/min (03/31/19 0645)  Start: 03/31/19 0645 End: 03/31/19 1539    0645-New Bag     153 03/31/19 1539    0800-Rate/Dose Change     0815-Rate/Dose Change     0830-Rate/Dose Change     0845-Rate/Dose Change     1539-D/C'd      sodium bicarbonate 150 mEq in sodium chloride 0.9% 1,000 mL infusion   Rate: 100 mL/hr Dose: 150 mEq  Freq: Continuous mcg/min (03/31/19 0105)  Start: 03/31/19 0105 End: 03/31/19 0105 0105-New Bag     0141-Handoff        sodium bicarbonate injection   Freq: Code/trauma medication Route: IV  Start: 03/31/19 0109 End: 03/31/19 0109 0109-Given        Medications 03/31

## 2019-04-10 NOTE — PAYOR COMM NOTE
--------------  DISCHARGE REVIEW    Payor: Jessica Robina #:  [id number]  Authorization Number: [id number]    Admit date: 3/31/19  Admit time:  [address] [place]  Discharge Date: 3/31/2019  1:39 PM     Admitting Physician: Joon Anders MD  Attending Physician:  Belle Gastelum Amiodarone. Her airway placed in the field was changed or an ET tube placed in the ER. She was taken to the Cath lab immediately.    In the Cath she had a stent to her LAD, swan olivia catheter placed via left CFV, zoll cooling initiated via right CFV an stenting of the proximal left anterior descending coronary artery. 6.     Selective angiography of the right common iliac artery.   Consultants:  • Cardiology  · Pulmonary   Cristopher Harrison DO      REVIEWER COMMENTS    FOR FINAL REVIEW

## 2019-04-27 NOTE — TELEPHONE ENCOUNTER
Dr. Kailee Early would like pt to be reminded to complete her labs and imaging that was ordered during office visit on 5/16/18.      LM with female at home # asking pt to call office back tomorrow after 9am. Home

## 2021-01-13 NOTE — ED PROVIDER NOTES
Patient Seen in: [facility] Emergency Department    History   Patient presents with:  Cardiac Arrest (cardiovascular)    Stated Complaint: Cardiac arrest    HPI    Patient is a 60-year-old female presenting to the emergency department via EMS in ful (Comment)       Current:BP (!) 220/96   Pulse (!) 125   Resp (!) 45   SpO2 93%         Physical Exam    Constitutional: Unresponsive. HENT:   Head: Normocephalic and atraumatic. Eyes: Pupils are 2 mm bilaterally nonresponsive.   Contacts noted in the eye CBC W/ DIFFERENTIAL - Abnormal; Notable for the following components:    WBC 14.5 (*)     .2 (*)     MCHC 30.1 (*)     All other components within normal limits   CBC WITH DIFFERENTIAL WITH PLATELET    Narrative:      The following orders were crea emergency department evaluating another patient and immediately responded. He updated Dr. Prem Barker, the interventional cardiologist.    150 mg of amiodarone was ordered given the history of V. fib in the field.   Admission disposition: 3/31/2019  2:30 AM directly to the Cath Lab. Follow-up:  No follow-up provider specified.       Medications Prescribed:  Current Discharge Medication List        Present on Admission  Date Reviewed: 5/16/2018          ICD-10-CM Noted POA    Cardiac arrest [facility]) I46.9 3/31/ Doxycycline Counseling:  Patient counseled regarding possible photosensitivity and increased risk for sunburn.  Patient instructed to avoid sunlight, if possible.  When exposed to sunlight, patients should wear protective clothing, sunglasses, and sunscreen.  The patient was instructed to call the office immediately if the following severe adverse effects occur:  hearing changes, easy bruising/bleeding, severe headache, or vision changes.  The patient verbalized understanding of the proper use and possible adverse effects of doxycycline.  All of the patient's questions and concerns were addressed.

## (undated) NOTE — LETTER
03/13/18    Dear Greg Hanson,    Our records indicate that you are due for one or more of the following Diabetic tests:       __x__              Eye exam     __x__              Diabetic Labs      __x__          Diabetic foot exam & blood pressure check at

## (undated) NOTE — LETTER
04/12/18    Dear Deb Maldonado,    Our records indicate that you are due for one or more of the following Diabetic tests:       __x__              Eye exam     __x__              Diabetic Labs      __x__          Diabetic foot exam & blood pressure check at